# Patient Record
Sex: MALE | Race: WHITE | NOT HISPANIC OR LATINO | RURAL
[De-identification: names, ages, dates, MRNs, and addresses within clinical notes are randomized per-mention and may not be internally consistent; named-entity substitution may affect disease eponyms.]

---

## 2023-01-03 ENCOUNTER — LAB VISIT (OUTPATIENT)
Dept: PRIMARY CARE CLINIC | Facility: CLINIC | Age: 43
End: 2023-01-03

## 2023-01-03 DIAGNOSIS — Z02.83 ENCOUNTER FOR DRUG SCREENING: Primary | ICD-10-CM

## 2023-01-03 PROCEDURE — 99000 SPECIMEN HANDLING OFFICE-LAB: CPT | Mod: ,,, | Performed by: NURSE PRACTITIONER

## 2023-01-03 PROCEDURE — 99000 PR SPECIMEN HANDLING,DR OFF->LAB: ICD-10-PCS | Mod: ,,, | Performed by: NURSE PRACTITIONER

## 2023-01-03 NOTE — PROGRESS NOTES
Subjective:       Patient ID: Abrahan Kelsey is a 42 y.o. male.    Chief Complaint: No chief complaint on file.    HPI  Review of Systems      Objective:      Physical Exam    Assessment:       Problem List Items Addressed This Visit    None  Visit Diagnoses       Encounter for drug screening    -  Primary            Plan:       Drug testing only

## 2023-12-29 ENCOUNTER — OFFICE VISIT (OUTPATIENT)
Dept: FAMILY MEDICINE | Facility: CLINIC | Age: 43
End: 2023-12-29
Payer: COMMERCIAL

## 2023-12-29 VITALS
DIASTOLIC BLOOD PRESSURE: 86 MMHG | RESPIRATION RATE: 19 BRPM | HEART RATE: 70 BPM | HEIGHT: 74 IN | TEMPERATURE: 98 F | OXYGEN SATURATION: 96 % | BODY MASS INDEX: 33.37 KG/M2 | SYSTOLIC BLOOD PRESSURE: 125 MMHG | WEIGHT: 260 LBS

## 2023-12-29 DIAGNOSIS — E29.1 HYPOGONADISM IN MALE: ICD-10-CM

## 2023-12-29 DIAGNOSIS — R53.83 FATIGUE, UNSPECIFIED TYPE: ICD-10-CM

## 2023-12-29 DIAGNOSIS — E55.9 VITAMIN D INSUFFICIENCY: ICD-10-CM

## 2023-12-29 DIAGNOSIS — Z13.220 SCREENING FOR LIPID DISORDERS: ICD-10-CM

## 2023-12-29 DIAGNOSIS — N52.9 ERECTILE DYSFUNCTION, UNSPECIFIED ERECTILE DYSFUNCTION TYPE: Primary | ICD-10-CM

## 2023-12-29 DIAGNOSIS — R68.82 DECREASED LIBIDO: Primary | ICD-10-CM

## 2023-12-29 LAB
25(OH)D3 SERPL-MCNC: 20.7 NG/ML
ALBUMIN SERPL BCP-MCNC: 3.9 G/DL (ref 3.5–5)
ALBUMIN/GLOB SERPL: 1.2 {RATIO}
ALP SERPL-CCNC: 68 U/L (ref 45–115)
ALT SERPL W P-5'-P-CCNC: 59 U/L (ref 16–61)
ANION GAP SERPL CALCULATED.3IONS-SCNC: 10 MMOL/L (ref 7–16)
AST SERPL W P-5'-P-CCNC: 26 U/L (ref 15–37)
BASOPHILS # BLD AUTO: 0.03 K/UL (ref 0–0.2)
BASOPHILS NFR BLD AUTO: 0.6 % (ref 0–1)
BILIRUB SERPL-MCNC: 0.6 MG/DL (ref ?–1.2)
BUN SERPL-MCNC: 11 MG/DL (ref 7–18)
BUN/CREAT SERPL: 11 (ref 6–20)
CALCIUM SERPL-MCNC: 8.9 MG/DL (ref 8.5–10.1)
CHLORIDE SERPL-SCNC: 107 MMOL/L (ref 98–107)
CHOLEST SERPL-MCNC: 189 MG/DL (ref 0–200)
CHOLEST/HDLC SERPL: 3.8 {RATIO}
CO2 SERPL-SCNC: 25 MMOL/L (ref 21–32)
CREAT SERPL-MCNC: 1.03 MG/DL (ref 0.7–1.3)
DIFFERENTIAL METHOD BLD: ABNORMAL
EGFR (NO RACE VARIABLE) (RUSH/TITUS): 93 ML/MIN/1.73M2
EOSINOPHIL # BLD AUTO: 0.1 K/UL (ref 0–0.5)
EOSINOPHIL NFR BLD AUTO: 2 % (ref 1–4)
ERYTHROCYTE [DISTWIDTH] IN BLOOD BY AUTOMATED COUNT: 12.5 % (ref 11.5–14.5)
GLOBULIN SER-MCNC: 3.3 G/DL (ref 2–4)
GLUCOSE SERPL-MCNC: 106 MG/DL (ref 74–106)
GLUCOSE SERPL-MCNC: 106 MG/DL (ref 74–106)
HCT VFR BLD AUTO: 46.9 % (ref 40–54)
HDLC SERPL-MCNC: 50 MG/DL (ref 40–60)
HGB BLD-MCNC: 16.4 G/DL (ref 13.5–18)
IMM GRANULOCYTES # BLD AUTO: 0 K/UL (ref 0–0.04)
IMM GRANULOCYTES NFR BLD: 0 % (ref 0–0.4)
LDLC SERPL CALC-MCNC: 113 MG/DL
LDLC/HDLC SERPL: 2.3 {RATIO}
LYMPHOCYTES # BLD AUTO: 1.68 K/UL (ref 1–4.8)
LYMPHOCYTES NFR BLD AUTO: 33.3 % (ref 27–41)
MCH RBC QN AUTO: 30.6 PG (ref 27–31)
MCHC RBC AUTO-ENTMCNC: 35 G/DL (ref 32–36)
MCV RBC AUTO: 87.5 FL (ref 80–96)
MONOCYTES # BLD AUTO: 0.37 K/UL (ref 0–0.8)
MONOCYTES NFR BLD AUTO: 7.3 % (ref 2–6)
MPC BLD CALC-MCNC: 10.7 FL (ref 9.4–12.4)
NEUTROPHILS # BLD AUTO: 2.87 K/UL (ref 1.8–7.7)
NEUTROPHILS NFR BLD AUTO: 56.8 % (ref 53–65)
NONHDLC SERPL-MCNC: 139 MG/DL
NRBC # BLD AUTO: 0 X10E3/UL
NRBC, AUTO (.00): 0 %
PLATELET # BLD AUTO: 219 K/UL (ref 150–400)
POTASSIUM SERPL-SCNC: 4.3 MMOL/L (ref 3.5–5.1)
PROT SERPL-MCNC: 7.2 G/DL (ref 6.4–8.2)
PSA SERPL-MCNC: 0.56 NG/ML
RBC # BLD AUTO: 5.36 M/UL (ref 4.6–6.2)
SHBG SERPL-SCNC: 17.6 NMOL/L (ref 27.9–146)
SODIUM SERPL-SCNC: 138 MMOL/L (ref 136–145)
TESTOST SERPL-MCNC: 272 NG/DL (ref 240–950)
TRIGL SERPL-MCNC: 131 MG/DL (ref 35–150)
TSH SERPL DL<=0.005 MIU/L-ACNC: 1.47 UIU/ML (ref 0.36–3.74)
VIT B12 SERPL-MCNC: 297 PG/ML (ref 193–986)
VLDLC SERPL-MCNC: 26 MG/DL
WBC # BLD AUTO: 5.05 K/UL (ref 4.5–11)

## 2023-12-29 PROCEDURE — 80050 PR  GENERAL HEALTH PANEL: ICD-10-PCS | Mod: ,,, | Performed by: CLINICAL MEDICAL LABORATORY

## 2023-12-29 PROCEDURE — 3074F SYST BP LT 130 MM HG: CPT | Mod: ,,, | Performed by: NURSE PRACTITIONER

## 2023-12-29 PROCEDURE — 3008F BODY MASS INDEX DOCD: CPT | Mod: ,,, | Performed by: NURSE PRACTITIONER

## 2023-12-29 PROCEDURE — 82607 VITAMIN B12: ICD-10-PCS | Mod: ,,, | Performed by: CLINICAL MEDICAL LABORATORY

## 2023-12-29 PROCEDURE — 84270 ASSAY OF SEX HORMONE GLOBUL: CPT | Mod: ,,, | Performed by: CLINICAL MEDICAL LABORATORY

## 2023-12-29 PROCEDURE — 3079F PR MOST RECENT DIASTOLIC BLOOD PRESSURE 80-89 MM HG: ICD-10-PCS | Mod: ,,, | Performed by: NURSE PRACTITIONER

## 2023-12-29 PROCEDURE — 82306 VITAMIN D 25 HYDROXY: CPT | Mod: ,,, | Performed by: CLINICAL MEDICAL LABORATORY

## 2023-12-29 PROCEDURE — 1160F PR REVIEW ALL MEDS BY PRESCRIBER/CLIN PHARMACIST DOCUMENTED: ICD-10-PCS | Mod: ,,, | Performed by: NURSE PRACTITIONER

## 2023-12-29 PROCEDURE — 99203 PR OFFICE/OUTPT VISIT, NEW, LEVL III, 30-44 MIN: ICD-10-PCS | Mod: ,,, | Performed by: NURSE PRACTITIONER

## 2023-12-29 PROCEDURE — 1159F PR MEDICATION LIST DOCUMENTED IN MEDICAL RECORD: ICD-10-PCS | Mod: ,,, | Performed by: NURSE PRACTITIONER

## 2023-12-29 PROCEDURE — 80061 LIPID PANEL: CPT | Mod: ,,, | Performed by: CLINICAL MEDICAL LABORATORY

## 2023-12-29 PROCEDURE — 3079F DIAST BP 80-89 MM HG: CPT | Mod: ,,, | Performed by: NURSE PRACTITIONER

## 2023-12-29 PROCEDURE — 3074F PR MOST RECENT SYSTOLIC BLOOD PRESSURE < 130 MM HG: ICD-10-PCS | Mod: ,,, | Performed by: NURSE PRACTITIONER

## 2023-12-29 PROCEDURE — 84403 TESTOSTERONE: ICD-10-PCS | Mod: ,,, | Performed by: CLINICAL MEDICAL LABORATORY

## 2023-12-29 PROCEDURE — 84403 ASSAY OF TOTAL TESTOSTERONE: CPT | Mod: ,,, | Performed by: CLINICAL MEDICAL LABORATORY

## 2023-12-29 PROCEDURE — 80050 GENERAL HEALTH PANEL: CPT | Mod: ,,, | Performed by: CLINICAL MEDICAL LABORATORY

## 2023-12-29 PROCEDURE — 1160F RVW MEDS BY RX/DR IN RCRD: CPT | Mod: ,,, | Performed by: NURSE PRACTITIONER

## 2023-12-29 PROCEDURE — 3008F PR BODY MASS INDEX (BMI) DOCUMENTED: ICD-10-PCS | Mod: ,,, | Performed by: NURSE PRACTITIONER

## 2023-12-29 PROCEDURE — 82607 VITAMIN B-12: CPT | Mod: ,,, | Performed by: CLINICAL MEDICAL LABORATORY

## 2023-12-29 PROCEDURE — 82306 VITAMIN D: ICD-10-PCS | Mod: ,,, | Performed by: CLINICAL MEDICAL LABORATORY

## 2023-12-29 PROCEDURE — G0103 PSA SCREENING: HCPCS | Mod: ,,, | Performed by: CLINICAL MEDICAL LABORATORY

## 2023-12-29 PROCEDURE — 99203 OFFICE O/P NEW LOW 30 MIN: CPT | Mod: ,,, | Performed by: NURSE PRACTITIONER

## 2023-12-29 PROCEDURE — 84270 SEX HORMONE BINDING GLOBULIN: ICD-10-PCS | Mod: ,,, | Performed by: CLINICAL MEDICAL LABORATORY

## 2023-12-29 PROCEDURE — 1159F MED LIST DOCD IN RCRD: CPT | Mod: ,,, | Performed by: NURSE PRACTITIONER

## 2023-12-29 PROCEDURE — G0103 PSA, SCREENING: ICD-10-PCS | Mod: ,,, | Performed by: CLINICAL MEDICAL LABORATORY

## 2023-12-29 PROCEDURE — 80061 LIPID PANEL: ICD-10-PCS | Mod: ,,, | Performed by: CLINICAL MEDICAL LABORATORY

## 2023-12-29 RX ORDER — TESTOSTERONE CYPIONATE 1000 MG/10ML
200 INJECTION, SOLUTION INTRAMUSCULAR
Qty: 10 ML | Refills: 3 | Status: SHIPPED | OUTPATIENT
Start: 2023-12-29 | End: 2024-10-04

## 2023-12-29 RX ORDER — ERGOCALCIFEROL 1.25 MG/1
50000 CAPSULE ORAL
Qty: 12 CAPSULE | Refills: 3 | Status: SHIPPED | OUTPATIENT
Start: 2023-12-29 | End: 2024-11-29

## 2023-12-29 RX ORDER — TADALAFIL 5 MG/1
TABLET ORAL
Qty: 30 TABLET | Refills: 1 | Status: SHIPPED | OUTPATIENT
Start: 2023-12-29

## 2023-12-29 NOTE — PROGRESS NOTES
"   JAKE Gates   [facility]  [address]  [place] [address]     PATIENT NAME: Abrahan Kelsey  : 1980  DATE: 23  MRN: [id number]      Billing Provider: JAKE Gates  Level of Service:   Patient PCP Information       Provider PCP Type    Primary Doctor No General            Reason for Visit / Chief Complaint: Establish Care (Abrahan Kelsey 43 year old male presents to establish care with a new primary care provider. Labs needed.), Low Testosterone (He would like to have his level checked. Reports extreme fatigue and testosterone has been low in the past.), and Health Maintenance (Hepatitis C Screening/COVID-19 Vaccine (1)/HIV Screening/TETANUS VACCINE/Patient declined all care gaps at this time.//)   Health Maintenance Due   Topic Date Due    Hepatitis C Screening  Never done    COVID-19 Vaccine (1) Never done    HIV Screening  Never done    TETANUS VACCINE  Never done    Hemoglobin A1c (Diabetic Prevention Screening)  Never done          History of Present Illness / Problem Focused Workflow     Abrahan Kelsey presents to the clinic with decreased libido and fatigue over the past several months and would like labs checked. He is not currently taking any medications. He denies any other needs/concerns at this time. NAD noted.     No results found for: "HGBA1C"     CMP  Sodium   Date Value Ref Range Status   2023 138 136 - 145 mmol/L Final     Potassium   Date Value Ref Range Status   2023 4.3 3.5 - 5.1 mmol/L Final     Chloride   Date Value Ref Range Status   2023 107 98 - 107 mmol/L Final     CO2   Date Value Ref Range Status   2023 25 21 - 32 mmol/L Final     Glucose   Date Value Ref Range Status   2023 106 74 - 106 mg/dL Final     BUN   Date Value Ref Range Status   2023 11 7 - 18 mg/dL Final     Creatinine   Date Value Ref Range Status   2023 1.03 0.70 - 1.30 mg/dL Final     Calcium   Date Value Ref Range Status "   12/29/2023 8.9 8.5 - 10.1 mg/dL Final     Total Protein   Date Value Ref Range Status   12/29/2023 7.2 6.4 - 8.2 g/dL Final     Albumin   Date Value Ref Range Status   12/29/2023 3.9 3.5 - 5.0 g/dL Final     Bilirubin, Total   Date Value Ref Range Status   12/29/2023 0.6 >0.0 - 1.2 mg/dL Final     Alk Phos   Date Value Ref Range Status   12/29/2023 68 45 - 115 U/L Final     AST   Date Value Ref Range Status   12/29/2023 26 15 - 37 U/L Final     ALT   Date Value Ref Range Status   12/29/2023 59 16 - 61 U/L Final     Anion Gap   Date Value Ref Range Status   12/29/2023 10 7 - 16 mmol/L Final     eGFR   Date Value Ref Range Status   12/29/2023 93 >=60 mL/min/1.73m2 Final        Lab Results   Component Value Date    WBC 5.05 12/29/2023    RBC 5.36 12/29/2023    HGB 16.4 12/29/2023    HCT 46.9 12/29/2023    MCV 87.5 12/29/2023    MCH 30.6 12/29/2023    MCHC 35.0 12/29/2023    RDW 12.5 12/29/2023     12/29/2023    MPV 10.7 12/29/2023    LYMPH 33.3 12/29/2023    LYMPH 1.68 12/29/2023    MONO 7.3 (H) 12/29/2023    EOS 0.10 12/29/2023    BASO 0.03 12/29/2023    EOSINOPHIL 2.0 12/29/2023    BASOPHIL 0.6 12/29/2023        Lab Results   Component Value Date    CHOL 189 12/29/2023     Lab Results   Component Value Date    HDL 50 12/29/2023     Lab Results   Component Value Date    LDLCALC 113 12/29/2023     Lab Results   Component Value Date    TRIG 131 12/29/2023     Lab Results   Component Value Date    CHOLHDL 3.8 12/29/2023        Wt Readings from Last 3 Encounters:   12/29/23 0847 117.9 kg (260 lb)        BP Readings from Last 3 Encounters:   12/29/23 125/86        Review of Systems     Review of Systems   Constitutional:  Positive for activity change and fatigue.   HENT: Negative.     Eyes: Negative.    Respiratory: Negative.     Cardiovascular: Negative.    Gastrointestinal: Negative.    Endocrine: Negative.    Genitourinary: Negative.         Decreased libido   Musculoskeletal: Negative.    Integumentary:   "Negative.   Allergic/Immunologic: Negative.    Neurological: Negative.    Hematological: Negative.    Psychiatric/Behavioral: Negative.          Medical / Social / Family History     Past Medical History:   Diagnosis Date    Low testosterone in male 01/01/2018       Past Surgical History:   Procedure Laterality Date    TOTAL SHOULDER ARTHROPLASTY Left 2015       Social History    reports that he has never smoked. He has never been exposed to tobacco smoke. His smokeless tobacco use includes snuff. He reports current alcohol use of about 1.0 standard drink of alcohol per week. He reports that he does not use drugs.    Family History  's family history includes Hypertension in his father; No Known Problems in his sister.    Medications and Allergies     Medications  No outpatient medications have been marked as taking for the 12/29/23 encounter (Office Visit) with Bebe Gill FNP.       Allergies  Review of patient's allergies indicates:  No Known Allergies    Physical Examination     Vitals:    12/29/23 0847   BP: 125/86   BP Location: Right arm   Patient Position: Sitting   BP Method: X-Large (Automatic)   Pulse: 70   Resp: 19   Temp: 98.3 °F (36.8 °C)   TempSrc: Oral   SpO2: 96%   Weight: 117.9 kg (260 lb)   Height: 6' 2" (1.88 m)      Physical Exam  Constitutional:       Appearance: Normal appearance.   HENT:      Head: Normocephalic.   Eyes:      Extraocular Movements: Extraocular movements intact.   Cardiovascular:      Rate and Rhythm: Normal rate and regular rhythm.      Pulses: Normal pulses.      Heart sounds: Normal heart sounds.   Pulmonary:      Effort: Pulmonary effort is normal.      Breath sounds: Normal breath sounds.   Musculoskeletal:         General: Normal range of motion.      Cervical back: Normal range of motion.   Skin:     General: Skin is warm and dry.      Capillary Refill: Capillary refill takes less than 2 seconds.   Neurological:      General: No focal deficit present.      " Mental Status: He is alert and oriented to person, place, and time.   Psychiatric:         Mood and Affect: Mood normal.         Behavior: Behavior normal.          Assessment and Plan (including Health Maintenance)   :    Plan:     There are no Patient Instructions on file for this visit.       Health Maintenance Due   Topic Date Due    Hepatitis C Screening  Never done    COVID-19 Vaccine (1) Never done    HIV Screening  Never done    TETANUS VACCINE  Never done    Hemoglobin A1c (Diabetic Prevention Screening)  Never done       Problem List Items Addressed This Visit       Decreased libido - Primary    Current Assessment & Plan     Labs pending. Will notify of results when available.          Relevant Orders    CBC Auto Differential (Completed)    Comprehensive Metabolic Panel (Completed)    Glucose, Fasting (Completed)    Testosterone (Completed)    PSA, Screening (Completed)    Sex Hormone Binding Globulin (Completed)    TSH (Completed)    Fatigue    Current Assessment & Plan     Labs pending. Will notify of results when available.          Relevant Orders    Vitamin D (Completed)    Vitamin B12 (Completed)    TSH (Completed)    Screening for lipid disorders    Current Assessment & Plan     Labs pending. Will notify of results when available.          Relevant Orders    Lipid Panel (Completed)     Decreased libido  -     CBC Auto Differential; Future; Expected date: 12/29/2023  -     Comprehensive Metabolic Panel; Future; Expected date: 12/29/2023  -     Glucose, Fasting; Future; Expected date: 12/29/2023  -     Testosterone; Future; Expected date: 12/29/2023  -     PSA, Screening; Future; Expected date: 12/29/2023  -     Sex Hormone Binding Globulin; Future; Expected date: 12/29/2023  -     TSH; Future; Expected date: 12/29/2023    Screening for lipid disorders  -     Lipid Panel; Future; Expected date: 12/29/2023    Fatigue, unspecified type  -     Vitamin D; Future; Expected date: 12/29/2023  -     Vitamin  B12; Future; Expected date: 12/29/2023  -     TSH; Future; Expected date: 12/29/2023       Health Maintenance Topics with due status: Not Due       Topic Last Completion Date    Lipid Panel 12/29/2023         No future appointments.     Follow up if symptoms worsen or fail to improve.    Health Maintenance Due   Topic Date Due    Hepatitis C Screening  Never done    COVID-19 Vaccine (1) Never done    HIV Screening  Never done    TETANUS VACCINE  Never done    Hemoglobin A1c (Diabetic Prevention Screening)  Never done        Signature:  JAKE Gates    Date of encounter: 12/29/23

## 2023-12-30 NOTE — PROGRESS NOTES
Informed of normal results other than vitamin d low. Will send in vitamin D capsule to take once weekly. Testosterone is in normal range but on the low side so will send in injections to take 2ml IM every 2 weeks. Pt has taken before so he can administer injections himself. Also requests something to help with Erectile Dysfunction so will send in Cialis, may take 5mg 1-2 tablets at least 30 minutes before sexual activity. Pt will be out of town working for the next 3 months but will RTC when returns to recheck labs.

## 2024-04-01 PROBLEM — Z13.220 SCREENING FOR LIPID DISORDERS: Status: RESOLVED | Noted: 2023-12-29 | Resolved: 2024-04-01

## 2025-02-12 ENCOUNTER — OFFICE VISIT (OUTPATIENT)
Dept: FAMILY MEDICINE | Facility: CLINIC | Age: 45
End: 2025-02-12
Payer: COMMERCIAL

## 2025-02-12 VITALS
HEART RATE: 66 BPM | HEART RATE: 67 BPM | WEIGHT: 256.19 LBS | BODY MASS INDEX: 32.88 KG/M2 | TEMPERATURE: 98 F | DIASTOLIC BLOOD PRESSURE: 84 MMHG | SYSTOLIC BLOOD PRESSURE: 132 MMHG | OXYGEN SATURATION: 98 % | HEIGHT: 74 IN | DIASTOLIC BLOOD PRESSURE: 84 MMHG | HEIGHT: 74 IN | WEIGHT: 256.19 LBS | RESPIRATION RATE: 18 BRPM | OXYGEN SATURATION: 98 % | BODY MASS INDEX: 32.88 KG/M2 | RESPIRATION RATE: 18 BRPM | SYSTOLIC BLOOD PRESSURE: 132 MMHG | TEMPERATURE: 98 F

## 2025-02-12 DIAGNOSIS — E66.811 OBESITY (BMI 30.0-34.9): Primary | ICD-10-CM

## 2025-02-12 DIAGNOSIS — N52.8 OTHER MALE ERECTILE DYSFUNCTION: ICD-10-CM

## 2025-02-12 DIAGNOSIS — Z02.89 ENCOUNTER FOR EXAMINATION REQUIRED BY DEPARTMENT OF TRANSPORTATION (DOT): Primary | ICD-10-CM

## 2025-02-12 LAB
BILIRUB SERPL-MCNC: NEGATIVE MG/DL
BLOOD URINE, POC: NEGATIVE
CLARITY, UA: CLEAR
COLOR, UA: YELLOW
GLUCOSE UR QL STRIP: NEGATIVE
KETONES UR QL STRIP: NEGATIVE
LEUKOCYTE ESTERASE URINE, POC: NEGATIVE
NITRITE, POC UA: NEGATIVE
PH, POC UA: 7
PROTEIN, POC: NEGATIVE
SPECIFIC GRAVITY, POC UA: 1.02
UROBILINOGEN, POC UA: NORMAL

## 2025-02-12 PROCEDURE — 99213 OFFICE O/P EST LOW 20 MIN: CPT | Mod: ,,, | Performed by: NURSE PRACTITIONER

## 2025-02-12 PROCEDURE — 1160F RVW MEDS BY RX/DR IN RCRD: CPT | Mod: ,,, | Performed by: NURSE PRACTITIONER

## 2025-02-12 PROCEDURE — 1159F MED LIST DOCD IN RCRD: CPT | Mod: ,,, | Performed by: NURSE PRACTITIONER

## 2025-02-12 PROCEDURE — 3079F DIAST BP 80-89 MM HG: CPT | Mod: ,,, | Performed by: NURSE PRACTITIONER

## 2025-02-12 PROCEDURE — 3075F SYST BP GE 130 - 139MM HG: CPT | Mod: ,,, | Performed by: NURSE PRACTITIONER

## 2025-02-12 PROCEDURE — 3008F BODY MASS INDEX DOCD: CPT | Mod: ,,, | Performed by: NURSE PRACTITIONER

## 2025-02-12 RX ORDER — PHENTERMINE HYDROCHLORIDE 37.5 MG/1
TABLET ORAL
Qty: 30 TABLET | Refills: 0 | Status: SHIPPED | OUTPATIENT
Start: 2025-02-12

## 2025-02-12 RX ORDER — SILDENAFIL 50 MG/1
TABLET, FILM COATED ORAL
Qty: 30 TABLET | Refills: 2 | Status: SHIPPED | OUTPATIENT
Start: 2025-02-12

## 2025-02-12 NOTE — ASSESSMENT & PLAN NOTE
reviewed with no aberrant behavior noted.  Adipex prescribed to take as directed.   Instructed on side effects of med and to inform provider of any adverse effects.   Pt aware rx can only be for 3 months and will have to RTC every month for rx.   UDS next visit.

## 2025-02-12 NOTE — ASSESSMENT & PLAN NOTE
Reviewed UA.  Advised  to carry a spare power source for hearing aids (if used) and to carry a spare pair of glasses/contact lenses if contacts or glasses are worn.  Discussed with  that there are potential side effects of medications (including over the counter medications) that may affect driving:  altered state of alertness, attention or temporary confusion, low blood pressure, sedation or dizziness.  Encouraged  to read labels on all medications prior to taking them.  Instructed pt not to drive/operate machinery for 12 hours after taking sedating medications.  Fatigue, lack of sleep, poor diet, emotional conditions, stress or illness can affect safe driving.  Pt verbalizes understanding.    Pt qualifies for 2 year certificate.

## 2025-02-12 NOTE — PROGRESS NOTES
"   JAKE Gates   [facility]  [address]  [place] [address]     PATIENT NAME: Abrahan Kelsey  : 1980  DATE: 25  MRN: [id number]      Billing Provider: JAKE Gtaes  Level of Service:   Patient PCP Information       Provider PCP Type    Primary Doctor No General            Reason for Visit / Chief Complaint: DOT Physical (Mr.Christopher STEPHANIE Kelsey is a 44 y.o. 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 He does not have a significant PMH and does not take any routine medications.     No results found for: "HGBA1C"     CMP  Sodium   Date Value Ref Range Status   2023 138 136 - 145 mmol/L Final     Potassium   Date Value Ref Range Status   2023 4.3 3.5 - 5.1 mmol/L Final     Chloride   Date Value Ref Range Status   2023 107 98 - 107 mmol/L Final     CO2   Date Value Ref Range Status   2023 25 21 - 32 mmol/L Final     Glucose   Date Value Ref Range Status   2023 106 74 - 106 mg/dL Final     BUN   Date Value Ref Range Status   2023 11 7 - 18 mg/dL Final     Creatinine   Date Value Ref Range Status   2023 1.03 0.70 - 1.30 mg/dL Final     Calcium   Date Value Ref Range Status   2023 8.9 8.5 - 10.1 mg/dL Final     Total Protein   Date Value Ref Range Status   2023 7.2 6.4 - 8.2 g/dL Final     Albumin   Date Value Ref Range Status   2023 3.9 3.5 - 5.0 g/dL Final     Bilirubin, Total   Date Value Ref Range Status   2023 0.6 >0.0 - 1.2 mg/dL Final     Alk Phos   Date Value Ref Range Status "   12/29/2023 68 45 - 115 U/L Final     AST   Date Value Ref Range Status   12/29/2023 26 15 - 37 U/L Final     ALT   Date Value Ref Range Status   12/29/2023 59 16 - 61 U/L Final     Anion Gap   Date Value Ref Range Status   12/29/2023 10 7 - 16 mmol/L Final     eGFR   Date Value Ref Range Status   12/29/2023 93 >=60 mL/min/1.73m2 Final        Lab Results   Component Value Date    WBC 5.05 12/29/2023    RBC 5.36 12/29/2023    HGB 16.4 12/29/2023    HCT 46.9 12/29/2023    MCV 87.5 12/29/2023    MCH 30.6 12/29/2023    MCHC 35.0 12/29/2023    RDW 12.5 12/29/2023     12/29/2023    MPV 10.7 12/29/2023    LYMPH 33.3 12/29/2023    LYMPH 1.68 12/29/2023    MONO 7.3 (H) 12/29/2023    EOS 0.10 12/29/2023    BASO 0.03 12/29/2023    EOSINOPHIL 2.0 12/29/2023    BASOPHIL 0.6 12/29/2023        Lab Results   Component Value Date    CHOL 189 12/29/2023     Lab Results   Component Value Date    HDL 50 12/29/2023     Lab Results   Component Value Date    LDLCALC 113 12/29/2023     Lab Results   Component Value Date    TRIG 131 12/29/2023     Lab Results   Component Value Date    CHOLHDL 3.8 12/29/2023        Wt Readings from Last 3 Encounters:   02/12/25 0819 116.2 kg (256 lb 3.2 oz)   12/29/23 0847 117.9 kg (260 lb)        BP Readings from Last 3 Encounters:   02/12/25 132/84   12/29/23 125/86        Review of Systems     Review of Systems   Constitutional: Negative.    HENT: Negative.     Eyes: Negative.    Respiratory: Negative.     Cardiovascular: Negative.    Gastrointestinal: Negative.    Endocrine: Negative.    Genitourinary: Negative.    Musculoskeletal: Negative.    Integumentary:  Negative.   Allergic/Immunologic: Negative.    Neurological: Negative.    Hematological: Negative.    Psychiatric/Behavioral: Negative.          Medical / Social / Family History     Past Medical History:   Diagnosis Date    Low testosterone in male 01/01/2018       Past Surgical History:   Procedure Laterality Date    TOTAL SHOULDER  "ARTHROPLASTY Left 2015       Social History    reports that he has never smoked. He has never been exposed to tobacco smoke. His smokeless tobacco use includes snuff. He reports current alcohol use of about 1.0 standard drink of alcohol per week. He reports that he does not use drugs.    Family History  's family history includes Hypertension in his father; No Known Problems in his sister.    Medications and Allergies     Medications  No outpatient medications have been marked as taking for the 2/12/25 encounter (Office Visit) with Bebe Gill FNP.       Allergies  Review of patient's allergies indicates:  No Known Allergies    Physical Examination     Vitals:    02/12/25 0819   BP: 132/84   BP Location: Left arm   Patient Position: Sitting   Pulse: 66   Resp: 18   Temp: 98.3 °F (36.8 °C)   TempSrc: Oral   SpO2: 98%   Weight: 116.2 kg (256 lb 3.2 oz)   Height: 6' 2" (1.88 m)      Physical Exam  Constitutional:       Appearance: Normal appearance. He is obese.   HENT:      Head: Normocephalic.      Nose: Nose normal.   Eyes:      Extraocular Movements: Extraocular movements intact.      Pupils: Pupils are equal, round, and reactive to light.   Cardiovascular:      Rate and Rhythm: Normal rate and regular rhythm.      Pulses: Normal pulses.      Heart sounds: Normal heart sounds.   Pulmonary:      Effort: Pulmonary effort is normal.      Breath sounds: Normal breath sounds.   Abdominal:      General: Abdomen is flat. Bowel sounds are normal.      Palpations: Abdomen is soft.   Musculoskeletal:         General: Normal range of motion.      Cervical back: Normal range of motion.   Skin:     General: Skin is warm and dry.      Capillary Refill: Capillary refill takes less than 2 seconds.   Neurological:      General: No focal deficit present.      Mental Status: He is alert and oriented to person, place, and time.   Psychiatric:         Mood and Affect: Mood normal.         Behavior: Behavior normal.      "     Assessment and Plan (including Health Maintenance)      Problem List  Smart Sets  Document Outside HM   :    Plan:     There are no Patient Instructions on file for this visit.       Health Maintenance Due   Topic Date Due    Hepatitis C Screening  Never done    HIV Screening  Never done    TETANUS VACCINE  Never done    Hemoglobin A1c (Diabetic Prevention Screening)  Never done    COVID-19 Vaccine (1 - 2024-25 season) Never done       Problem List Items Addressed This Visit       Encounter for examination required by Department of Transportation (DOT) - Primary    Current Assessment & Plan     Reviewed UA.  Advised  to carry a spare power source for hearing aids (if used) and to carry a spare pair of glasses/contact lenses if contacts or glasses are worn.  Discussed with  that there are potential side effects of medications (including over the counter medications) that may affect driving:  altered state of alertness, attention or temporary confusion, low blood pressure, sedation or dizziness.  Encouraged  to read labels on all medications prior to taking them.  Instructed pt not to drive/operate machinery for 12 hours after taking sedating medications.  Fatigue, lack of sleep, poor diet, emotional conditions, stress or illness can affect safe driving.  Pt verbalizes understanding.    Pt qualifies for 2 year certificate.          Relevant Orders    POCT URINALYSIS W/O SCOPE (Completed)     Encounter for examination required by Department of Transportation (DOT)  -     POCT URINALYSIS W/O SCOPE       Health Maintenance Topics with due status: Not Due       Topic Last Completion Date    Lipid Panel 12/29/2023    RSV Vaccine (Age 60+ and Pregnant patients) Not Due         Future Appointments   Date Time Provider Department Center   2/12/2025  9:00 AM Bebe Gill FNP [facility]   3/12/2025  9:00 AM Bebe Gill FNP [facility]        No follow-ups on file.    Health  Maintenance Due   Topic Date Due    Hepatitis C Screening  Never done    HIV Screening  Never done    TETANUS VACCINE  Never done    Hemoglobin A1c (Diabetic Prevention Screening)  Never done    COVID-19 Vaccine (1 - 2024-25 season) Never done        Signature:  JAKE Gates    Date of encounter: 2/12/25

## 2025-02-12 NOTE — PROGRESS NOTES
"   JAKE Gates   [facility]  [address]  [place] [address]     PATIENT NAME: Abrahan Kelsey  : 1980  DATE: 25  MRN: [id number]      Billing Provider: JAKE Gates  Level of Service:   Patient PCP Information       Provider PCP Type    Primary Doctor No General            Reason for Visit / Chief Complaint: Obesity (Mr.Christopher STEPHANIE Kelsey is a 44 y.o. male who presents to the clinic today  for assistance with weight loss.)   Health Maintenance Due   Topic Date Due    Hepatitis C Screening  Never done    HIV Screening  Never done    TETANUS VACCINE  Never done    Hemoglobin A1c (Diabetic Prevention Screening)  Never done    COVID-19 Vaccine (1 - 2024-25 season) Never done          Update PCP  Update Chief Complaint         History of Present Illness / Problem Focused Workflow     Abrahan Kelsey presents to the clinic with fiance today requesting something for weight loss and erectile dysfunction. They would like to try viagra as they have tried otc eloise chew and is just not strong enough. Pt has never taken anything for weight loss before but would like to try Adipex. He states he has been unsuccessful at losing weight despite diet and exercise. He had labs  that were unremarkable. He denies any other needs at this time.      No results found for: "HGBA1C"     CMP  Sodium   Date Value Ref Range Status   2023 138 136 - 145 mmol/L Final     Potassium   Date Value Ref Range Status   2023 4.3 3.5 - 5.1 mmol/L Final     Chloride   Date Value Ref Range Status   2023 107 98 - 107 mmol/L Final     CO2   Date Value Ref Range Status   2023 25 21 - 32 mmol/L Final     Glucose   Date Value Ref Range Status   2023 106 74 - 106 mg/dL Final     BUN   Date Value Ref Range Status   2023 11 7 - 18 mg/dL Final     Creatinine   Date Value Ref Range Status   2023 1.03 0.70 - 1.30 mg/dL Final     Calcium   Date Value Ref Range Status "   12/29/2023 8.9 8.5 - 10.1 mg/dL Final     Total Protein   Date Value Ref Range Status   12/29/2023 7.2 6.4 - 8.2 g/dL Final     Albumin   Date Value Ref Range Status   12/29/2023 3.9 3.5 - 5.0 g/dL Final     Bilirubin, Total   Date Value Ref Range Status   12/29/2023 0.6 >0.0 - 1.2 mg/dL Final     Alk Phos   Date Value Ref Range Status   12/29/2023 68 45 - 115 U/L Final     AST   Date Value Ref Range Status   12/29/2023 26 15 - 37 U/L Final     ALT   Date Value Ref Range Status   12/29/2023 59 16 - 61 U/L Final     Anion Gap   Date Value Ref Range Status   12/29/2023 10 7 - 16 mmol/L Final     eGFR   Date Value Ref Range Status   12/29/2023 93 >=60 mL/min/1.73m2 Final        Lab Results   Component Value Date    WBC 5.05 12/29/2023    RBC 5.36 12/29/2023    HGB 16.4 12/29/2023    HCT 46.9 12/29/2023    MCV 87.5 12/29/2023    MCH 30.6 12/29/2023    MCHC 35.0 12/29/2023    RDW 12.5 12/29/2023     12/29/2023    MPV 10.7 12/29/2023    LYMPH 33.3 12/29/2023    LYMPH 1.68 12/29/2023    MONO 7.3 (H) 12/29/2023    EOS 0.10 12/29/2023    BASO 0.03 12/29/2023    EOSINOPHIL 2.0 12/29/2023    BASOPHIL 0.6 12/29/2023        Lab Results   Component Value Date    CHOL 189 12/29/2023     Lab Results   Component Value Date    HDL 50 12/29/2023     Lab Results   Component Value Date    LDLCALC 113 12/29/2023     Lab Results   Component Value Date    TRIG 131 12/29/2023     Lab Results   Component Value Date    CHOLHDL 3.8 12/29/2023        Wt Readings from Last 3 Encounters:   02/12/25 0850 116.2 kg (256 lb 3.2 oz)   02/12/25 0819 116.2 kg (256 lb 3.2 oz)   12/29/23 0847 117.9 kg (260 lb)        BP Readings from Last 3 Encounters:   02/12/25 132/84   02/12/25 132/84   12/29/23 125/86        Review of Systems     Review of Systems   Constitutional: Negative.    Eyes: Negative.    Respiratory: Negative.     Cardiovascular: Negative.    Gastrointestinal: Negative.    Endocrine: Negative.    Genitourinary:         ED  "  Musculoskeletal: Negative.    Integumentary:  Negative.   Allergic/Immunologic: Negative.    Neurological: Negative.    Hematological: Negative.    Psychiatric/Behavioral: Negative.          Medical / Social / Family History     Past Medical History:   Diagnosis Date    Low testosterone in male 01/01/2018       Past Surgical History:   Procedure Laterality Date    TOTAL SHOULDER ARTHROPLASTY Left 2015       Social History    reports that he has never smoked. He has never been exposed to tobacco smoke. His smokeless tobacco use includes snuff. He reports current alcohol use of about 1.0 standard drink of alcohol per week. He reports that he does not use drugs.    Family History  's family history includes Hypertension in his father; No Known Problems in his sister.    Medications and Allergies     Medications  No outpatient medications have been marked as taking for the 2/12/25 encounter (Office Visit) with Bebe Gill FNP.       Allergies  Review of patient's allergies indicates:  No Known Allergies    Physical Examination     Vitals:    02/12/25 0850   BP: 132/84   BP Location: Left arm   Patient Position: Sitting   Pulse: 67   Resp: 18   Temp: 98.4 °F (36.9 °C)   TempSrc: Oral   SpO2: 98%   Weight: 116.2 kg (256 lb 3.2 oz)   Height: 6' 2" (1.88 m)      Physical Exam  Constitutional:       Appearance: Normal appearance. He is obese.   HENT:      Head: Normocephalic.   Eyes:      Extraocular Movements: Extraocular movements intact.   Cardiovascular:      Rate and Rhythm: Normal rate and regular rhythm.      Pulses: Normal pulses.      Heart sounds: Normal heart sounds.   Pulmonary:      Effort: Pulmonary effort is normal.      Breath sounds: Normal breath sounds.   Skin:     General: Skin is warm and dry.      Capillary Refill: Capillary refill takes less than 2 seconds.   Neurological:      General: No focal deficit present.      Mental Status: He is alert and oriented to person, place, and time. "   Psychiatric:         Mood and Affect: Mood normal.         Behavior: Behavior normal.          Assessment and Plan (including Health Maintenance)      Problem List  Smart Sets  Document Outside HM   :    Plan:     There are no Patient Instructions on file for this visit.       Health Maintenance Due   Topic Date Due    Hepatitis C Screening  Never done    HIV Screening  Never done    TETANUS VACCINE  Never done    Hemoglobin A1c (Diabetic Prevention Screening)  Never done    COVID-19 Vaccine (1 - 2024-25 season) Never done       Problem List Items Addressed This Visit       Obesity (BMI 30.0-34.9) - Primary    Current Assessment & Plan      reviewed with no aberrant behavior noted.  Adipex prescribed to take as directed.   Instructed on side effects of med and to inform provider of any adverse effects.   Pt aware rx can only be for 3 months and will have to RTC every month for rx.   UDS next visit.            Relevant Medications    phentermine (ADIPEX-P) 37.5 mg tablet    Other male erectile dysfunction    Current Assessment & Plan     Viagra prescribed to take as directed as needed. Do not take with Adipex.          Relevant Medications    sildenafiL (VIAGRA) 50 MG tablet     Obesity (BMI 30.0-34.9)  -     phentermine (ADIPEX-P) 37.5 mg tablet; TAKE 1/2 TABLET BY MOUTH FOR 7 DAYS THEN TAKE TAKE ONE TABLET BY MOUTH DAILY.  Dispense: 30 tablet; Refill: 0    Other male erectile dysfunction  -     sildenafiL (VIAGRA) 50 MG tablet; TAKE 1-2 TABLETS BY MOUTH DAILY AS NEEDED AT LEAST 30 MINUTES BEFORE INTERCOURSE.  Dispense: 30 tablet; Refill: 2       Health Maintenance Topics with due status: Not Due       Topic Last Completion Date    Lipid Panel 12/29/2023    RSV Vaccine (Age 60+ and Pregnant patients) Not Due         Future Appointments   Date Time Provider Department Center   3/12/2025  9:00 AM Bebe Gill FNP [facility]        No follow-ups on file.    Health Maintenance Due   Topic Date Due     Hepatitis C Screening  Never done    HIV Screening  Never done    TETANUS VACCINE  Never done    Hemoglobin A1c (Diabetic Prevention Screening)  Never done    COVID-19 Vaccine (1 - 2024-25 season) Never done        Signature:  JAKE Gates    Date of encounter: 2/12/25

## 2025-05-08 ENCOUNTER — PATIENT OUTREACH (OUTPATIENT)
Facility: HOSPITAL | Age: 45
End: 2025-05-08
Payer: COMMERCIAL

## 2025-05-08 NOTE — PROGRESS NOTES
Population Health Review...  Per [facility] website, insurance is active and pt is listed on the attributed list needing a healthy you performed in 2025  HY scheduled for 6/17/25